# Patient Record
Sex: MALE | URBAN - METROPOLITAN AREA
[De-identification: names, ages, dates, MRNs, and addresses within clinical notes are randomized per-mention and may not be internally consistent; named-entity substitution may affect disease eponyms.]

---

## 2023-06-02 ENCOUNTER — MEDICAL CORRESPONDENCE (OUTPATIENT)
Dept: HEALTH INFORMATION MANAGEMENT | Facility: CLINIC | Age: 53
End: 2023-06-02

## 2023-06-08 ENCOUNTER — TELEPHONE (OUTPATIENT)
Dept: PSYCHIATRY | Facility: CLINIC | Age: 53
End: 2023-06-08

## 2023-06-08 NOTE — TELEPHONE ENCOUNTER
Received referral from Lauren Brunner PA-C at [facility].    Dx: Agoraphobia, PTSD, Recurrent major depressive disorder, in partial remission, MARGARETTE.    Please call patient to schedule appointment. Encounter routed to [facility] SLP New Referrals.     Renetta Alan, CMA

## 2023-10-02 DIAGNOSIS — F33.41 RECURRENT MAJOR DEPRESSION IN PARTIAL REMISSION (H): ICD-10-CM

## 2023-10-02 DIAGNOSIS — F43.10 POSTTRAUMATIC STRESS DISORDER: ICD-10-CM

## 2023-10-02 DIAGNOSIS — F41.1 GENERALIZED ANXIETY DISORDER: Primary | ICD-10-CM

## 2023-10-02 DIAGNOSIS — F40.00 AGORAPHOBIA: ICD-10-CM

## 2023-10-05 ENCOUNTER — TELEPHONE (OUTPATIENT)
Dept: PSYCHIATRY | Facility: CLINIC | Age: 53
End: 2023-10-05

## 2023-10-05 NOTE — TELEPHONE ENCOUNTER
Reached out to patient to schedule New TRD per referral received on 10/02/2023. No answer, left detailed message for a call back.

## 2024-02-12 ENCOUNTER — VIRTUAL VISIT (OUTPATIENT)
Dept: PSYCHIATRY | Facility: CLINIC | Age: 54
End: 2024-02-12
Payer: COMMERCIAL

## 2024-02-12 DIAGNOSIS — F43.10 PTSD (POST-TRAUMATIC STRESS DISORDER): ICD-10-CM

## 2024-02-12 DIAGNOSIS — F33.2 SEVERE EPISODE OF RECURRENT MAJOR DEPRESSIVE DISORDER, WITHOUT PSYCHOTIC FEATURES (H): Primary | ICD-10-CM

## 2024-02-12 DIAGNOSIS — F41.1 GENERALIZED ANXIETY DISORDER: ICD-10-CM

## 2024-02-12 NOTE — NURSING NOTE
Unable to complete any of the registration info due to not being completed at time appt was made and time restraint due to patient not answering the phone until 2 mins to appt time.   Also, unable to complete any rooming of Allergies, Medications, Vitals, Tobacco, QRNS due to time restraint.       Is the patient currently in the state of [place]? YES    Visit mode:VIDEO    If the visit is dropped, the patient can be reconnected by: VIDEO VISIT: Text to cell phone:   Telephone Information:   Mobile [phone]       Will anyone else be joining the visit? NO  (If patient encounters technical issues they should call [phone] :[phone])    How would you like to obtain your AVS? MyChart    Are changes needed to the allergy or medication list?  Unable to review due to time restraint.     Reason for visit: Consult    Yenifer ENCISO

## 2024-02-12 NOTE — PROGRESS NOTES
[facility] Treatment Resistant Depression Program  Diagnostic Assessment  A part of the [facility] Psychiatry Mood Disorders Program    Estiven Lovell MRN# [id number]   Age: 53 year old YOB: 1970     Date of Evaluation: 2/12/24  Start Time: 1:03; End Time: 2:10    Mode of communication: [employer] (HIPAA compliant, secure platform). Patient consented verbally to this mode of therapy today.  Reason for telehealth: COVID-19. This patient visit was converted to a telehealth visit to minimize exposure to COVID-19.    Originating Location (patient location): home, located in [place]  Distant Location (provider location): Home office, located in [place], [place], using appropriate privacy considerations and procedures         Care Team     PCP- No primary care provider on file.  Specialty Providers- no  Therapist-  Uma Pimentel  Psychiatric Med Management Provider-  PCP   Other Mental Health Providers- no    Referred by:  Psychotherapist  Referred for evaluation of:  depression and anxiety.         Contributors to the Assessment     Chart Reviewed.   Interview completed with Estiven Lovell.  Releases of information signed?  no  Collateral information obtained? no           Chief Complaint     Depression, anxiety, agoraphobia         Psychiatric History and Present Illness      Estiven Lovell is a 53 year old male who goes by Estiven and uses he, him, his pronouns.    Estiven reports one lifetime episode(s) of depression and has a history of no psychiatric hospitalization(s).     Estiven's first episode of depression started in July, 2020 following his brother's suicide. Estiven reports that since then there has not been a period of at least two months with full resolution of symptoms.    Current psychosocial stressors include not working, social isolation, grief/loss     Estiven is interested in TMS and/or ketamine treatment.      Past diagnoses: PTSD, MARGARETTE, agoraphobia    Past medication trials: multiple  "trials    Hospitalizations: none    Commitment: No, Current [facility] order: No    ECT trials: No    TMS trials:  No      Ketamine:  No    Suicide attempts: No    Self-injurious behavior: No    Aggressive or violent behavior: No    Past Outpatient Programs & Services  Medication management and Outpatient individual psychotherapy    Psych critical item history trauma hx and mutiple psychotropic trials     Other mental health concerns discussed: anxiety, trauma, grief, famiky dynamics    Sleep study: yes, no findings    ADHD testing: no    Current Outpatient Programs & Services  Medication management, individual therapy         Psychiatric Review of Systems (Completed M.I.N.I. Version 7.0.2)     A. DEPRESSION  Past 2 Weeks:  low mood nearly every day, anhedonia most of the time, difficulties with sleep, psychomotor changes (agitation), low energy, and worthlessness and/or guilt    Past Episode:  low mood nearly every day, anhedonia most of the time, difficulties with sleep, psychomotor changes (agitation), low energy, and worthlessness and/or guilt    B. SUICIDALITY:  Risk: Low  Current suicidal ideation: No, denies a plan, and denies intent.     -reports 0% in response to \"How likely are to you to try to kill yourself within the next 3 months on a scale from 0-100%?\"  -denies current SIB/Self Injurious Behavior and denies past SIB    -reports no lifetime suicide attempts.  He has notable risk factors for self-harm including feels trapped, hopelessness, lives alone/ isolated, severe anxiety, recent loss, financial/legal stress, relationship conflict, and male.  However, risk is mitigated by no h/o suicide attempt, no plan or intent, no h/o risky impulsive behavior, describes a safety plan, none to minimal alcohol use , and stable housing.      C. ARIEL/HYPOMANIA  Current Episode:  none    Past Episode:  none    D. PANIC:  unprovoked anxiety/fear, peaking in < 10 minutes, heart palpitations, SOB, choking sensation, GI " distress, and dizziness    E. AGORAPHOBIA:  marked fear/anxiety in crowds, standing in line, and enclosed space because of fear that escape might be difficult or help might not be available;, almost always, with active avoidance, a  or are endured with intense anxiety/fear, at a level out of proportion to the actual danger posed, lasting 6 months or more, and causing clinically significant distress or impairement in social, occupation, or other important areas of functioning     F. SOCIAL ANXIETY:  none    G. OBSESSIVE-COMPULSIVE:  none    H. TRAUMA:  experienced traumatic event, re-experienced trauma, persistent avoidance of recollections of trauma, negative emotions, anhedonia, detachment from others, inability to experience happiness, persistent irritability or unprovoked anger/outbursts, nervousness, and difficulty sleeping    I. ALCOHOL & J. NON-ALCOHOL:  See below    K. PSYCHOSIS:   none    L-M. EATING DISORDER: none    N. GENERALIZED ANXIETY:  excessive anxiety or worry about several routine things, most days, with difficulty controlling worry, feel restless, keyed up or on edge, muscle tension, irritability, and difficulty sleeping    O. RULE OUT MEDICAL, ORGANIC OR DRUG CAUSES FOR ALL DISORDERS  During any current disorder or past mood episode, patient reports:  A. Substance use or withdrawal: No  B. Medical illness: No    P. ANTISOCIAL PERSONALITY:  before age 13 - none and since age 13 -  done illegal acts and exposed others or yourself to danger without caring     Other Cluster B Traits Identified (not formally assessed):  none discussed          SUBSTANCE USE HISTORY                                                                 CAGE-AID    Have you felt you ought to cut down on your drinking or drug use? no  Have people annoyed you by criticizing your drinking or drug use? no  Have you felt bad or guilty about your drinking or drug use? no  Have you ever had a drink or used drugs first thing  "in the morning to steady your nerves or to get rid of a hangover? no    CAGE-AID SCORE = 0      RECENT SUBSTANCE USE:     TOBACCO- none  CAFFEINE-  none  ALCOHOL- <1/week     CANNABIS- none            OTHER ILLICIT DRUGS- none    Past Use-   TOBACCO- none  CAFFEINE-  none  ALCOHOL- remote history of one DUI  CANNABIS- none            OTHER ILLICIT DRUGS- none    CD Treatment history: No  Medical consequences due to use (eg HIV/Hepatitis)- No  Legal consequences due to use- one DUI    SOCIAL and FAMILY HISTORY                                                             Estiven Lovell is a 53 year old single (never )  male with a psychiatric history of anxiety, agoraphobia, trauma, depression who presents for a [facility] Treatment Resistant Depression program evaluation. Estiven was referred by his therapist.     Living situation: Estiven lives alone in a Private Residence.   Kids? No  Pets? Yes - a doggo, Isabel  Guns, weapons, or other means to harm oneself in the home? Yes. Stored locked, denies SI, denies gun causes SI     Education: Estiven s highest level of education is college graduate    Occupation: Estiven is currently not working    Finances: Estiven is financial supported by [employer], Supplemental Security Income and Food Support / SNAP    Relationships (kid/grandkids, spouse/partner, friends, support people): Specific Relationships & Quality of Relationship: Estiven reports he does not have a social group or support system. He is estranged from his family and has \"absolutely no one.\"  The estrangement from his family is related to his brother's suicide in 2020.    Spiritual considerations: No    Legal History: Yes - one DUI many years ago    Trauma/Abuse History: Yes - brother's suicide in 2020, family blaming him      History: No    Family Mental Health History: denies an mental illness    Strengths & Coping Strategies:  Estiven is pleasant, educated, and seeking additional care.     PAST PSYCH MED " TRIALS      Will be reviewed during MTM.    MEDICAL / SURGICAL HISTORY                                   There is no problem list on file for this patient.      No past surgical history on file.     History of seizures: no   History of head trauma/loss of consciousness: no     ALLERGY                                Patient has no allergy information on record.    MEDICATIONS                               No current outpatient medications on file.       VITALS                                                                                                                            3, 3   There were no vitals taken for this visit.     MENTAL STATUS EXAM                                                                                    9, 14 cog gs     Alertness: alert  and oriented  Appearance: adequately groomed  Behavior/Demeanor: passive and guarded, with good  eye contact   Speech: normal and regular rate and rhythm  Language: intact and no problems  Psychomotor: normal or unremarkable  Mood: depressed, anxious, and irritable  Affect: restricted and blunted; was congruent to mood; was congruent to content  Thought Process/Associations: unremarkable  Thought Content:  Reports none;  Denies suicidal and violent ideation  Perception:  Reports none;  Denies auditory hallucinations and visual hallucinations  Insight: adequate  Judgment: adequate for safety  Cognition: does appear grossly intact; formal cognitive testing was not done    PSYCHIATRIC DIAGNOSES                                                                                               Major depressive disorder  Agoraphobia  Generalized anxiety disorder    PTSD, by history      ASSESSMENT                                                                                                          m2, h3     Please note, writer did not receive all pertinent medical records as of the time of this assessment. Estiven did not sign TREVON's for additional records.      Today, Estiven presents as a Adequate historian with Adequate insight. Estiven s past and present depressive symptoms seem consistent with a diagnosis of major depressive disorder. Depressive symptoms seem to contribute to impaired functioning in the areas of family / partner relationships , social relationships, occupational performance, and emotional wellbeing . Precipitating factors may include brother's death. Perpetuating factors may consist of limited response to therapy and mediation.     The M.I.N.I. scores positively for a diagnosis/diagnoses of agoraphobia, MARGARETTE, PTSD. Substance use does not seem to be a current problem.     Further diagnostic information is needed to clarify  a diagnosis of agoraphobia and panic attacks vs panic disorder with agoraphobia.  Estiven has a diagnosis of PTSD from his current therapist and prescriber, and meets criteria on the MINI.     Estiven reports no symptoms of depression, anxiety, or PTSD in childhood, adolescence, and most of adulthood. In July, 2020 Estiven's brother completed suicide and he started to experience anxiety and depression shortly after that. He reports that his family blames him for his brother's death and he has been estranged from them for several years because of this.      Basic needs (food, housing, transportation, safety, income stability): met, with assistance    Today, based on all available evidence, he does not appear to be at imminent risk for self-harm therefore does not meet criteria for a 72-hr hold/  involuntary hospitalization.     Additional steps to minimize risk discussed, include: people to reach out to, providers to reach out to, crisis numbers and texts, and EmPATH.  24/7 crisis resources were provided verbally.     PLAN                                                                                                                        m2, h3   Patient will meet with TRD program PharmD and TRD program Psychiatrist to complete  comprehensive multi-disciplinary assessment. Informed Estiven that if appropriate for Interventional Psychiatry treatments, care will be provided with goal of stabilization with subsequent transfer back to the community (i.e. PCP or previous psychiatrist).    Medications: to be addressed during an MTM visit and new patient medication evaluation with a Psychiatrist    Therapy:  Yes - continue with individual therapy. Consider working with a therapist who it trained in treating trauma, grief, and loss.    Crisis Resources provided:  24/7 crisis resources including but not limited to the following:   - National Suicide Prevention Hotline: 1-783-655-TALK (2392)   - Crisis Text Line: Text START to 821-019   - Mental Health Emergency Number: 988   - EmPATH at [employer]    Other Referrals:  No    RTC: For MTM visit.    China Santana [facility]         Virtual Visit Details    Type of service:  Video Visit     Originating Location (pt. Location): Home    Distant Location (provider location):  Off-site  Platform used for Video Visit: Dimitrios

## 2024-02-28 ENCOUNTER — TELEPHONE (OUTPATIENT)
Dept: PSYCHIATRY | Facility: CLINIC | Age: 54
End: 2024-02-28

## 2024-02-28 NOTE — TELEPHONE ENCOUNTER
Patient needs to be rescheduled for their virtual visit due to Reason for Reschedule: No-Show    Appointment mode: Video  Provider: Pretty Lagunas RPH

## 2024-03-31 PROBLEM — K21.9 GASTROESOPHAGEAL REFLUX DISEASE WITHOUT ESOPHAGITIS: Status: ACTIVE | Noted: 2023-06-08

## 2024-03-31 PROBLEM — F40.00 AGORAPHOBIA: Status: ACTIVE | Noted: 2021-07-23

## 2024-03-31 PROBLEM — E29.1 MALE HYPOGONADISM: Status: ACTIVE | Noted: 2023-09-08

## 2024-03-31 PROBLEM — F43.10 PTSD (POST-TRAUMATIC STRESS DISORDER): Status: ACTIVE | Noted: 2020-10-09

## 2024-03-31 PROBLEM — R73.03 PREDIABETES: Status: ACTIVE | Noted: 2023-05-31

## 2024-03-31 PROBLEM — M79.7 FIBROMYALGIA: Status: ACTIVE | Noted: 2024-03-31

## 2024-03-31 PROBLEM — I10 ESSENTIAL HYPERTENSION: Status: ACTIVE | Noted: 2021-09-17

## 2024-03-31 PROBLEM — E78.2 MIXED HYPERLIPIDEMIA: Status: ACTIVE | Noted: 2022-09-26
